# Patient Record
Sex: FEMALE | Race: WHITE | HISPANIC OR LATINO | Employment: PART TIME | URBAN - METROPOLITAN AREA
[De-identification: names, ages, dates, MRNs, and addresses within clinical notes are randomized per-mention and may not be internally consistent; named-entity substitution may affect disease eponyms.]

---

## 2020-01-17 ENCOUNTER — LAB (OUTPATIENT)
Dept: LAB | Facility: HOSPITAL | Age: 25
End: 2020-01-17

## 2020-01-17 ENCOUNTER — TRANSCRIBE ORDERS (OUTPATIENT)
Dept: LAB | Facility: HOSPITAL | Age: 25
End: 2020-01-17

## 2020-01-17 DIAGNOSIS — E23.0 AHUMADA-DEL CASTILLO SYNDROME (HCC): Primary | ICD-10-CM

## 2020-01-17 LAB
GLUCOSE BLD-MCNC: 68 MG/DL (ref 65–99)
GLUCOSE P FAST SERPL-MCNC: 78 MG/DL (ref 65–99)
HCG INTACT+B SERPL-ACNC: <0.5 MIU/ML
PROGEST SERPL-MCNC: 3.25 NG/ML
PROLACTIN SERPL-MCNC: 17.2 NG/ML (ref 4.79–23.3)
TESTOST SERPL-MCNC: 19.8 NG/DL (ref 8.4–48.1)
TSH SERPL DL<=0.05 MIU/L-ACNC: 1.07 UIU/ML (ref 0.27–4.2)

## 2020-01-17 PROCEDURE — 82947 ASSAY GLUCOSE BLOOD QUANT: CPT

## 2020-01-17 PROCEDURE — 84402 ASSAY OF FREE TESTOSTERONE: CPT

## 2020-01-17 PROCEDURE — 84443 ASSAY THYROID STIM HORMONE: CPT

## 2020-01-17 PROCEDURE — 84146 ASSAY OF PROLACTIN: CPT

## 2020-01-17 PROCEDURE — 84702 CHORIONIC GONADOTROPIN TEST: CPT

## 2020-01-17 PROCEDURE — 84144 ASSAY OF PROGESTERONE: CPT

## 2020-01-17 PROCEDURE — 84403 ASSAY OF TOTAL TESTOSTERONE: CPT

## 2020-01-17 PROCEDURE — 82627 DEHYDROEPIANDROSTERONE: CPT

## 2020-01-17 PROCEDURE — 36415 COLL VENOUS BLD VENIPUNCTURE: CPT

## 2020-01-18 LAB — DHEA-S SERPL-MCNC: 148.5 UG/DL (ref 110–431.7)

## 2020-01-20 LAB — TESTOST FREE SERPL-MCNC: 1.4 PG/ML (ref 0–4.2)

## 2020-01-21 LAB — SPECIMEN STATUS: NORMAL

## 2020-01-24 ENCOUNTER — TRANSCRIBE ORDERS (OUTPATIENT)
Dept: LAB | Facility: HOSPITAL | Age: 25
End: 2020-01-24

## 2020-01-24 ENCOUNTER — LAB (OUTPATIENT)
Dept: LAB | Facility: HOSPITAL | Age: 25
End: 2020-01-24

## 2020-01-24 DIAGNOSIS — E23.0 AHUMADA-DEL CASTILLO SYNDROME (HCC): ICD-10-CM

## 2020-01-24 DIAGNOSIS — E23.0 AHUMADA-DEL CASTILLO SYNDROME (HCC): Primary | ICD-10-CM

## 2020-01-24 PROCEDURE — 83525 ASSAY OF INSULIN: CPT

## 2020-01-24 PROCEDURE — 36415 COLL VENOUS BLD VENIPUNCTURE: CPT

## 2020-01-30 LAB — INSULIN SERPL-ACNC: 26 UIU/ML

## 2021-03-16 ENCOUNTER — IMMUNIZATION (OUTPATIENT)
Dept: VACCINE CLINIC | Facility: HOSPITAL | Age: 26
End: 2021-03-16

## 2021-03-16 PROCEDURE — 91300 HC SARSCOV02 VAC 30MCG/0.3ML IM: CPT | Performed by: INTERNAL MEDICINE

## 2021-03-16 PROCEDURE — 0001A: CPT | Performed by: INTERNAL MEDICINE

## 2021-04-06 ENCOUNTER — IMMUNIZATION (OUTPATIENT)
Dept: VACCINE CLINIC | Facility: HOSPITAL | Age: 26
End: 2021-04-06

## 2021-04-06 PROCEDURE — 0002A: CPT | Performed by: INTERNAL MEDICINE

## 2021-04-06 PROCEDURE — 91300 HC SARSCOV02 VAC 30MCG/0.3ML IM: CPT | Performed by: INTERNAL MEDICINE

## 2021-10-21 PROCEDURE — 87661 TRICHOMONAS VAGINALIS AMPLIF: CPT | Performed by: FAMILY MEDICINE

## 2021-10-21 PROCEDURE — 87591 N.GONORRHOEAE DNA AMP PROB: CPT | Performed by: FAMILY MEDICINE

## 2021-10-21 PROCEDURE — 87491 CHLMYD TRACH DNA AMP PROBE: CPT | Performed by: FAMILY MEDICINE

## 2021-10-22 ENCOUNTER — OFFICE VISIT (OUTPATIENT)
Dept: INTERNAL MEDICINE | Facility: CLINIC | Age: 26
End: 2021-10-22

## 2021-10-22 VITALS
SYSTOLIC BLOOD PRESSURE: 118 MMHG | WEIGHT: 178.8 LBS | HEIGHT: 63 IN | BODY MASS INDEX: 31.68 KG/M2 | DIASTOLIC BLOOD PRESSURE: 78 MMHG | OXYGEN SATURATION: 97 % | TEMPERATURE: 97.5 F | HEART RATE: 66 BPM | RESPIRATION RATE: 14 BRPM

## 2021-10-22 DIAGNOSIS — Z76.89 ENCOUNTER TO ESTABLISH CARE: ICD-10-CM

## 2021-10-22 DIAGNOSIS — R10.2 PELVIC PAIN: ICD-10-CM

## 2021-10-22 DIAGNOSIS — L83 ACANTHOSIS NIGRICANS: ICD-10-CM

## 2021-10-22 DIAGNOSIS — E88.81 INSULIN RESISTANCE: ICD-10-CM

## 2021-10-22 DIAGNOSIS — R10.30 LOWER ABDOMINAL PAIN: Primary | ICD-10-CM

## 2021-10-22 DIAGNOSIS — N94.6 PAINFUL MENSTRUAL PERIODS: ICD-10-CM

## 2021-10-22 PROBLEM — E88.819 INSULIN RESISTANCE: Status: ACTIVE | Noted: 2021-10-22

## 2021-10-22 LAB
BILIRUB BLD-MCNC: NEGATIVE MG/DL
CLARITY, POC: ABNORMAL
COLOR UR: YELLOW
EXPIRATION DATE: ABNORMAL
GLUCOSE UR STRIP-MCNC: NEGATIVE MG/DL
KETONES UR QL: NEGATIVE
LEUKOCYTE EST, POC: ABNORMAL
Lab: ABNORMAL
NITRITE UR-MCNC: NEGATIVE MG/ML
PH UR: 6 [PH] (ref 5–8)
PROT UR STRIP-MCNC: NEGATIVE MG/DL
RBC # UR STRIP: ABNORMAL /UL
SP GR UR: 1.01 (ref 1–1.03)
UROBILINOGEN UR QL: NORMAL

## 2021-10-22 PROCEDURE — 99214 OFFICE O/P EST MOD 30 MIN: CPT | Performed by: NURSE PRACTITIONER

## 2021-10-22 PROCEDURE — 81003 URINALYSIS AUTO W/O SCOPE: CPT | Performed by: NURSE PRACTITIONER

## 2021-10-22 NOTE — PROGRESS NOTES
Office Visit      Patient Name: Kathleen Munoz  : 1995   MRN: [id number]   Care Team: Patient Care Team:  Princess Pineda APRN as PCP - General (Family Medicine)    Chief Complaint  Establish Care and Abdominal Pain (lower into the right side )    Subjective     Subjective      Kathleen Munoz presents to [facility] to establish care and for complaints of lower abdominal pain. Symptoms started about 2 months ago. The symptoms wax and wane. Pain is worse with sexual intercourse and sitting for prolonged periods of time. Walking helps alleviate the pain at times. Describes pain as a stabbing pulsating pain in the right suprapubic area. She has been evaluated by [facility] for the problem with negative UA, STD testing is pending, and urine pregnancy was negative. Her menses is regular with normal flow however her abdominal cramping is severe and only on the right side. She has had a recent pap smear that was normal.  No prior history of ovarian cyst or fibroid.  Pertinent medical history includes insulin resistance and depression.  Feels like mood is controlled currently.  Currently on no daily medications.  Does report some darkening of the skin in her neck folds, groin, and abdomen.  She has no other acute complaints today.  She likely declines vaccinations today.    Review of Systems   Constitutional: Negative for appetite change, fatigue and fever.   HENT: Negative for congestion, sore throat and trouble swallowing.    Eyes: Negative for blurred vision and visual disturbance.   Respiratory: Negative for cough, shortness of breath and wheezing.    Cardiovascular: Negative for chest pain, palpitations and leg swelling.   Gastrointestinal: Positive for abdominal pain (right suprapubic). Negative for blood in stool, constipation, diarrhea and nausea.   Endocrine: Negative for polydipsia, polyphagia and polyuria.   Genitourinary: Positive for menstrual problem  "and pelvic pain. Negative for dysuria.   Musculoskeletal: Negative for arthralgias, back pain and myalgias.   Skin: Negative for rash.   Neurological: Negative for dizziness, weakness, light-headedness and headache.   Psychiatric/Behavioral: Negative for sleep disturbance and depressed mood. The patient is not nervous/anxious.        Objective     Objective   Vital Signs:   /78   Pulse 66   Temp 97.5 °F (36.4 °C) (Temporal)   Resp 14   Ht 160 cm (63\")   Wt 81.1 kg (178 lb 12.8 oz)   SpO2 97%   BMI 31.67 kg/m²     Physical Exam  Vitals and nursing note reviewed.   Constitutional:       General: She is not in acute distress.     Appearance: Normal appearance. She is not toxic-appearing.   Eyes:      Pupils: Pupils are equal, round, and reactive to light.   Neck:      Vascular: No carotid bruit.      Comments: Acanthosis nigricans present in folds of the neck bilaterally  Cardiovascular:      Rate and Rhythm: Normal rate and regular rhythm.      Heart sounds: Normal heart sounds. No murmur heard.      Pulmonary:      Effort: Pulmonary effort is normal. No respiratory distress.      Breath sounds: Normal breath sounds. No wheezing.   Abdominal:      General: Bowel sounds are normal. There is no distension.      Palpations: Abdomen is soft.      Tenderness: There is abdominal tenderness in the suprapubic area.       Musculoskeletal:      Cervical back: Neck supple. No tenderness.   Skin:     General: Skin is warm and dry.      Findings: No rash.   Neurological:      General: No focal deficit present.      Mental Status: She is alert.   Psychiatric:         Mood and Affect: Mood normal.         Behavior: Behavior normal.          Assessment / Plan      Assessment/Plan   Problem List Items Addressed This Visit     None      Visit Diagnoses     Lower abdominal pain    -  Primary    Relevant Orders    POCT urinalysis dipstick, automated (Completed)    Urine Culture - Urine, Urine, Clean Catch    US Non-ob " Transvaginal    CBC No Differential    Comprehensive metabolic panel    Hemoglobin A1c    Hepatitis C antibody    Encounter to establish care        Relevant Orders    US Non-ob Transvaginal    CBC No Differential    Comprehensive metabolic panel    Hemoglobin A1c    Hepatitis C antibody    Pelvic pain        Relevant Orders    US Non-ob Transvaginal    CBC No Differential    Comprehensive metabolic panel    Hemoglobin A1c    Hepatitis C antibody    Painful menstrual periods        Relevant Orders    US Non-ob Transvaginal    CBC No Differential    Comprehensive metabolic panel    Hemoglobin A1c    Hepatitis C antibody     Brief Urine Lab Results  (Last result in the past 365 days)      Color   Clarity   Blood   Leuk Est   Nitrite   Protein   CREAT   Urine HCG        10/22/21 1117 Yellow   Cloudy   Trace   Trace   Negative   Negative                 Abdominal pain likely related to OB/GYN causes.  Will perform TVUS to rule out fibroid or cyst and referred to OB/GYN as needed.  Recommend using ibuprofen and moist heat as needed for pain.  Advised to begin taking daily ibuprofen the week prior to menses and she verbalized understanding.  Will send culture due to blood and trace leukocytes, if culture positive will treat for UTI.  Vies to increase her water intake.    Insulin resistance        Relevant Orders    CBC No Differential    Comprehensive metabolic panel    Hemoglobin A1c    Hepatitis C antibody    Labs as above today.  Advised to decrease amount of carbohydrates, sugar, and processed foods in the diet.           Follow Up   Return in about 4 weeks (around 11/19/2021) for Annual.  Patient was given instructions and counseling regarding her condition or for health maintenance advice. Please see specific information pulled into the AVS if appropriate.     CHLOÉ David  [facility]

## 2021-10-23 LAB
ALBUMIN SERPL-MCNC: 4.8 G/DL (ref 3.5–5.2)
ALBUMIN/GLOB SERPL: 1.8 G/DL
ALP SERPL-CCNC: 109 U/L (ref 39–117)
ALT SERPL-CCNC: 14 U/L (ref 1–33)
AST SERPL-CCNC: 14 U/L (ref 1–32)
BILIRUB SERPL-MCNC: 0.6 MG/DL (ref 0–1.2)
BUN SERPL-MCNC: 8 MG/DL (ref 6–20)
BUN/CREAT SERPL: 11 (ref 7–25)
CALCIUM SERPL-MCNC: 9.3 MG/DL (ref 8.6–10.5)
CHLORIDE SERPL-SCNC: 102 MMOL/L (ref 98–107)
CO2 SERPL-SCNC: 27.6 MMOL/L (ref 22–29)
CREAT SERPL-MCNC: 0.73 MG/DL (ref 0.57–1)
ERYTHROCYTE [DISTWIDTH] IN BLOOD BY AUTOMATED COUNT: 14 % (ref 12.3–15.4)
GLOBULIN SER CALC-MCNC: 2.6 GM/DL
GLUCOSE SERPL-MCNC: 75 MG/DL (ref 65–99)
HBA1C MFR BLD: 4.5 % (ref 4.8–5.6)
HCT VFR BLD AUTO: 42.1 % (ref 34–46.6)
HCV AB S/CO SERPL IA: <0.1 S/CO RATIO (ref 0–0.9)
HGB BLD-MCNC: 13.9 G/DL (ref 12–15.9)
MCH RBC QN AUTO: 29.1 PG (ref 26.6–33)
MCHC RBC AUTO-ENTMCNC: 33 G/DL (ref 31.5–35.7)
MCV RBC AUTO: 88.1 FL (ref 79–97)
PLATELET # BLD AUTO: 292 10*3/MM3 (ref 140–450)
POTASSIUM SERPL-SCNC: 4.4 MMOL/L (ref 3.5–5.2)
PROT SERPL-MCNC: 7.4 G/DL (ref 6–8.5)
RBC # BLD AUTO: 4.78 10*6/MM3 (ref 3.77–5.28)
SODIUM SERPL-SCNC: 139 MMOL/L (ref 136–145)
WBC # BLD AUTO: 7.1 10*3/MM3 (ref 3.4–10.8)

## 2021-10-24 ENCOUNTER — TELEPHONE (OUTPATIENT)
Dept: URGENT CARE | Facility: CLINIC | Age: 26
End: 2021-10-24

## 2021-10-24 LAB
BACTERIA UR CULT: NORMAL
BACTERIA UR CULT: NORMAL

## 2021-10-26 ENCOUNTER — TELEPHONE (OUTPATIENT)
Dept: INTERNAL MEDICINE | Facility: CLINIC | Age: 26
End: 2021-10-26

## 2021-10-26 NOTE — TELEPHONE ENCOUNTER
Caller: Kathleen Zhao    Relationship to patient: Self    Best call back number:     Patient is needing: PATIENT RETURNING CALL TO OFFICE

## 2021-11-17 ENCOUNTER — APPOINTMENT (OUTPATIENT)
Dept: ULTRASOUND IMAGING | Facility: HOSPITAL | Age: 26
End: 2021-11-17

## 2022-02-09 ENCOUNTER — TELEPHONE (OUTPATIENT)
Dept: INTERNAL MEDICINE | Facility: CLINIC | Age: 27
End: 2022-02-09

## 2022-02-09 NOTE — TELEPHONE ENCOUNTER
Caller: Kathleen Zhao    Relationship: Self    Best call back number: [phone]    What is the best time to reach you: ANYTIME    Who are you requesting to speak with (clinical staff, provider,  specific staff member): PROVIDER    What was the call regarding: PATIENT STATES THAT SHE HAD AN REFERRAL FOR AN ULTRASOUND AND NEVER WENT AND  WOULD LIKE TO KNOW IF SHE NEEDS ANOTHER REFERRAL FOR AN ULTRA SOUND ON ABDOMEN OR CAN SHE SCHEDULE HERSELF    Do you require a callback: YES

## 2022-02-09 NOTE — TELEPHONE ENCOUNTER
Called spoke to Kathleen she is aware that she will just need to contact scheduling in the imaging department to reschedule. She verbalized understanding.

## 2022-02-23 ENCOUNTER — HOSPITAL ENCOUNTER (OUTPATIENT)
Dept: ULTRASOUND IMAGING | Facility: HOSPITAL | Age: 27
Discharge: HOME | End: 2022-02-23
Admitting: NURSE PRACTITIONER

## 2022-02-23 DIAGNOSIS — R10.2 PELVIC PAIN: ICD-10-CM

## 2022-02-23 DIAGNOSIS — R10.30 LOWER ABDOMINAL PAIN: ICD-10-CM

## 2022-02-23 DIAGNOSIS — N94.6 PAINFUL MENSTRUAL PERIODS: ICD-10-CM

## 2022-02-23 DIAGNOSIS — Z76.89 ENCOUNTER TO ESTABLISH CARE: ICD-10-CM

## 2022-02-23 PROCEDURE — 76830 TRANSVAGINAL US NON-OB: CPT

## 2023-05-03 ENCOUNTER — OFFICE VISIT (OUTPATIENT)
Dept: FAMILY MEDICINE CLINIC | Facility: OTHER | Age: 28
End: 2023-05-03
Payer: COMMERCIAL

## 2023-05-03 VITALS
WEIGHT: 178 LBS | TEMPERATURE: 96.9 F | DIASTOLIC BLOOD PRESSURE: 74 MMHG | SYSTOLIC BLOOD PRESSURE: 116 MMHG | OXYGEN SATURATION: 98 % | HEART RATE: 79 BPM | BODY MASS INDEX: 31.54 KG/M2 | RESPIRATION RATE: 16 BRPM | HEIGHT: 63 IN

## 2023-05-03 DIAGNOSIS — J06.9 ACUTE URI: ICD-10-CM

## 2023-05-03 DIAGNOSIS — H66.002 NON-RECURRENT ACUTE SUPPURATIVE OTITIS MEDIA OF LEFT EAR WITHOUT SPONTANEOUS RUPTURE OF TYMPANIC MEMBRANE: Primary | ICD-10-CM

## 2023-05-03 LAB
EXPIRATION DATE: NORMAL
INTERNAL CONTROL: NORMAL
Lab: NORMAL
S PYO AG THROAT QL: NEGATIVE

## 2023-05-03 RX ORDER — AMOXICILLIN 500 MG/1
1000 CAPSULE ORAL 2 TIMES DAILY
Qty: 28 CAPSULE | Refills: 0 | Status: SHIPPED | OUTPATIENT
Start: 2023-05-03 | End: 2023-05-10

## 2023-05-03 RX ORDER — TRANEXAMIC ACID 650 MG/1
TABLET ORAL
COMMUNITY
Start: 2023-04-30

## 2023-05-03 RX ORDER — CELECOXIB 200 MG/1
CAPSULE ORAL
COMMUNITY
Start: 2023-04-30

## 2023-05-03 NOTE — PROGRESS NOTES
"                      Established Patient        Chief Complaint:   Chief Complaint   Patient presents with   • Nasal Congestion   • Sore Throat   • Sinusitis         History of Present Illness:    Kathleen Munoz is a 28 y.o. female who presents today for complaints of sore throat. Started approximately 5 days ago. States that Sunday morning, patient woke up with mild congestion and PND. Tried herbal remedies and states that yesterday, started to notice a headache and sore throat worsened. Reports thin, yellow-tinged nasal mucous. Reports that last week she felt like her ears were \"swollen\".     Subjective     The following portions of the patient's history were reviewed and updated as appropriate: allergies, current medications, past family history, past medical history, past social history, past surgical history and problem list.    ALLERGIES  No Known Allergies    ROS  Review of Systems   Constitutional: Negative for chills, fatigue and fever.   HENT: Positive for congestion, postnasal drip, rhinorrhea and sore throat.    Respiratory: Negative for cough.    Gastrointestinal: Negative for diarrhea, nausea and vomiting.   Neurological: Positive for headaches.       Objective     Vital Signs:   /74   Pulse 79   Temp 96.9 °F (36.1 °C)   Resp 16   Ht 160 cm (63\")   Wt 80.7 kg (178 lb)   SpO2 98%   BMI 31.53 kg/m²     BMI is >= 30 and <35. (Class 1 Obesity). The following options were offered after discussion;: referral to primary care       Physical Exam   Physical Exam  Vitals and nursing note reviewed.   HENT:      Right Ear: A middle ear effusion is present.      Left Ear: A middle ear effusion is present. Tympanic membrane is erythematous and bulging.      Nose: Mucosal edema and congestion present.      Right Turbinates: Swollen.      Left Turbinates: Swollen.      Mouth/Throat:      Pharynx: Pharyngeal swelling, posterior oropharyngeal erythema and uvula swelling present. "   Cardiovascular:      Rate and Rhythm: Normal rate and regular rhythm.      Heart sounds: Normal heart sounds.   Pulmonary:      Effort: Pulmonary effort is normal.      Breath sounds: Normal breath sounds.   Neurological:      Mental Status: She is alert and oriented to person, place, and time.   Psychiatric:         Mood and Affect: Mood normal.         Behavior: Behavior normal.       Assessment and Plan      Assessment/Plan:   Diagnoses and all orders for this visit:    1. Acute URI (Primary)    2. Non-recurrent acute suppurative otitis media of left ear without spontaneous rupture of tympanic membrane  -     amoxicillin (AMOXIL) 500 MG capsule; Take 2 capsules by mouth 2 (Two) Times a Day for 7 days.  Dispense: 28 capsule; Refill: 0      Discussion Summary:  Discussed plan of care in detail with pt today; pt verb understanding and agrees.      I spent 25 minutes caring for Kathleen on this date of service. This time includes time spent by me in the following activities:preparing for the visit, reviewing tests, obtaining and/or reviewing a separately obtained history, performing a medically appropriate examination and/or evaluation , counseling and educating the patient/family/caregiver, ordering medications, tests, or procedures and documenting information in the medical record      I have reviewed and updated all copied forward information, as appropriate.  I attest to the accuracy and relevance of any unchanged information.      Follow up:  Return if symptoms worsen or fail to improve.     Patient Education:  There are no Patient Instructions on file for this visit.    CHLOÉ Bernal  05/03/23  15:47 EDT          Please note that portions of this note may have been completed with a voice recognition program.